# Patient Record
Sex: FEMALE | Race: WHITE | URBAN - METROPOLITAN AREA
[De-identification: names, ages, dates, MRNs, and addresses within clinical notes are randomized per-mention and may not be internally consistent; named-entity substitution may affect disease eponyms.]

---

## 2017-03-01 ENCOUNTER — HISTORICAL (OUTPATIENT)
Dept: ADMINISTRATIVE | Facility: HOSPITAL | Age: 32
End: 2017-03-01

## 2017-12-05 ENCOUNTER — HISTORICAL (OUTPATIENT)
Dept: INTERNAL MEDICINE | Facility: CLINIC | Age: 32
End: 2017-12-05

## 2017-12-05 LAB
ABS NEUT (OLG): 4 X10(3)/MCL (ref 2.1–9.2)
ALBUMIN SERPL-MCNC: 3.3 GM/DL (ref 3.4–5)
ALBUMIN/GLOB SERPL: 1 RATIO (ref 1–2)
ALP SERPL-CCNC: 37 UNIT/L (ref 45–117)
ALT SERPL-CCNC: 19 UNIT/L (ref 12–78)
APPEARANCE, UA: CLEAR
AST SERPL-CCNC: 9 UNIT/L (ref 15–37)
BACTERIA #/AREA URNS AUTO: ABNORMAL /[HPF]
BASOPHILS # BLD AUTO: 0.02 X10(3)/MCL
BASOPHILS NFR BLD AUTO: 0 % (ref 0–1)
BILIRUB SERPL-MCNC: 0.2 MG/DL (ref 0.2–1)
BILIRUB UR QL STRIP: NEGATIVE
BILIRUBIN DIRECT+TOT PNL SERPL-MCNC: <0.1 MG/DL
BILIRUBIN DIRECT+TOT PNL SERPL-MCNC: ABNORMAL MG/DL
BUN SERPL-MCNC: 18 MG/DL (ref 7–18)
CALCIUM SERPL-MCNC: 8.2 MG/DL (ref 8.5–10.1)
CHLORIDE SERPL-SCNC: 106 MMOL/L (ref 98–107)
CHOLEST SERPL-MCNC: 165 MG/DL
CHOLEST/HDLC SERPL: 3.1 {RATIO} (ref 0–4.4)
CO2 SERPL-SCNC: 32 MMOL/L (ref 21–32)
COLOR UR: ABNORMAL
CREAT SERPL-MCNC: 0.8 MG/DL (ref 0.6–1.3)
EOSINOPHIL # BLD AUTO: 0.11 X10(3)/MCL
EOSINOPHIL NFR BLD AUTO: 1 % (ref 0–5)
ERYTHROCYTE [DISTWIDTH] IN BLOOD BY AUTOMATED COUNT: 13.5 % (ref 11.5–14.5)
EST. AVERAGE GLUCOSE BLD GHB EST-MCNC: 105 MG/DL
GLOBULIN SER-MCNC: 2.9 GM/ML (ref 2.3–3.5)
GLUCOSE (UA): NORMAL
GLUCOSE SERPL-MCNC: 84 MG/DL (ref 74–106)
HAV IGM SERPL QL IA: NONREACTIVE
HBA1C MFR BLD: 5.3 % (ref 4.2–6.3)
HBV CORE IGM SERPL QL IA: NONREACTIVE
HBV SURFACE AG SERPL QL IA: NEGATIVE
HCT VFR BLD AUTO: 34.2 % (ref 35–46)
HCV AB SERPL QL IA: NONREACTIVE
HDLC SERPL-MCNC: 53 MG/DL
HGB BLD-MCNC: 11 GM/DL (ref 12–16)
HGB UR QL STRIP: 0.06 MG/DL
HIV 1+2 AB+HIV1 P24 AG SERPL QL IA: NONREACTIVE
HYALINE CASTS #/AREA URNS LPF: ABNORMAL /[LPF]
IMM GRANULOCYTES # BLD AUTO: 0.04 10*3/UL
IMM GRANULOCYTES NFR BLD AUTO: 0 %
KETONES UR QL STRIP: NEGATIVE
LDLC SERPL CALC-MCNC: 84 MG/DL (ref 0–130)
LEUKOCYTE ESTERASE UR QL STRIP: NEGATIVE
LYMPHOCYTES # BLD AUTO: 3.94 X10(3)/MCL
LYMPHOCYTES NFR BLD AUTO: 46 % (ref 15–40)
MCH RBC QN AUTO: 30.8 PG (ref 26–34)
MCHC RBC AUTO-ENTMCNC: 32.2 GM/DL (ref 31–37)
MCV RBC AUTO: 95.8 FL (ref 80–100)
MONOCYTES # BLD AUTO: 0.49 X10(3)/MCL
MONOCYTES NFR BLD AUTO: 6 % (ref 4–12)
NEUTROPHILS # BLD AUTO: 4 X10(3)/MCL
NEUTROPHILS NFR BLD AUTO: 46 X10(3)/MCL
NITRITE UR QL STRIP: NEGATIVE
PH UR STRIP: 6.5 [PH] (ref 4.5–8)
PLATELET # BLD AUTO: 205 X10(3)/MCL (ref 130–400)
PMV BLD AUTO: 11.3 FL (ref 7.4–10.4)
POTASSIUM SERPL-SCNC: 4.1 MMOL/L (ref 3.5–5.1)
PROT SERPL-MCNC: 6.2 GM/DL (ref 6.4–8.2)
PROT UR QL STRIP: NEGATIVE
RBC # BLD AUTO: 3.57 X10(6)/MCL (ref 4–5.2)
RBC #/AREA URNS AUTO: ABNORMAL /[HPF]
SODIUM SERPL-SCNC: 145 MMOL/L (ref 136–145)
SP GR UR STRIP: 1.01 (ref 1–1.03)
SQUAMOUS #/AREA URNS LPF: ABNORMAL /[LPF]
T4 FREE SERPL-MCNC: 0.7 NG/DL (ref 0.76–1.46)
TRIGL SERPL-MCNC: 138 MG/DL
TSH SERPL-ACNC: 6.76 MIU/L (ref 0.36–3.74)
UROBILINOGEN UR STRIP-ACNC: NORMAL MG/DL
VLDLC SERPL CALC-MCNC: 28 MG/DL
WBC # SPEC AUTO: 8.6 X10(3)/MCL (ref 4.5–11)
WBC #/AREA URNS AUTO: ABNORMAL /HPF

## 2017-12-06 ENCOUNTER — HISTORICAL (OUTPATIENT)
Dept: INTERNAL MEDICINE | Facility: CLINIC | Age: 32
End: 2017-12-06

## 2017-12-06 LAB
DEPRECATED CALCIDIOL+CALCIFEROL SERPL-MC: 22.07 NG/ML (ref 30–80)
MAGNESIUM SERPL-MCNC: 2.2 MG/DL (ref 1.8–2.4)
PTH-INTACT SERPL-MCNC: 94.8 PG/ML (ref 13.8–85)

## 2018-01-14 ENCOUNTER — HISTORICAL (OUTPATIENT)
Dept: INTERNAL MEDICINE | Facility: CLINIC | Age: 33
End: 2018-01-14

## 2018-01-14 LAB
ALBUMIN SERPL-MCNC: 3.7 GM/DL (ref 3.4–5)
ALBUMIN/GLOB SERPL: 1 RATIO (ref 1–2)
ALP SERPL-CCNC: 38 UNIT/L (ref 45–117)
ALT SERPL-CCNC: 17 UNIT/L (ref 12–78)
AST SERPL-CCNC: 13 UNIT/L (ref 15–37)
BILIRUB SERPL-MCNC: 0.3 MG/DL (ref 0.2–1)
BILIRUBIN DIRECT+TOT PNL SERPL-MCNC: 0.1 MG/DL
BILIRUBIN DIRECT+TOT PNL SERPL-MCNC: 0.2 MG/DL
BUN SERPL-MCNC: 27 MG/DL (ref 7–18)
CALCIUM SERPL-MCNC: 8.6 MG/DL (ref 8.5–10.1)
CHLORIDE SERPL-SCNC: 106 MMOL/L (ref 98–107)
CO2 SERPL-SCNC: 28 MMOL/L (ref 21–32)
CREAT SERPL-MCNC: 0.6 MG/DL (ref 0.6–1.3)
DEPRECATED CALCIDIOL+CALCIFEROL SERPL-MC: 33.04 NG/ML (ref 30–80)
GLOBULIN SER-MCNC: 3.2 GM/ML (ref 2.3–3.5)
GLUCOSE SERPL-MCNC: 96 MG/DL (ref 74–106)
POTASSIUM SERPL-SCNC: 4.1 MMOL/L (ref 3.5–5.1)
PROT SERPL-MCNC: 6.9 GM/DL (ref 6.4–8.2)
SODIUM SERPL-SCNC: 142 MMOL/L (ref 136–145)
T3FREE SERPL-MCNC: 2.46 PG/ML (ref 2.18–3.98)
T4 FREE SERPL-MCNC: 0.73 NG/DL (ref 0.76–1.46)
TSH SERPL-ACNC: 1.94 MIU/L (ref 0.36–3.74)

## 2020-05-17 ENCOUNTER — HOSPITAL ENCOUNTER (OUTPATIENT)
Dept: NUTRITION | Facility: HOSPITAL | Age: 35
End: 2020-05-18
Attending: SURGERY | Admitting: SURGERY

## 2020-05-17 LAB
ABS NEUT (OLG): 7.06 X10(3)/MCL (ref 2.1–9.2)
ALBUMIN SERPL-MCNC: 2.7 GM/DL (ref 3.5–5)
ALBUMIN/GLOB SERPL: 0.8 RATIO (ref 1.1–2)
ALP SERPL-CCNC: 747 UNIT/L (ref 40–150)
ALT SERPL-CCNC: 210 UNIT/L (ref 0–55)
ANISOCYTOSIS BLD QL SMEAR: 1
APPEARANCE, UA: CLEAR
AST SERPL-CCNC: 140 UNIT/L (ref 5–34)
B-HCG SERPL QL: NEGATIVE
BACTERIA SPEC CULT: ABNORMAL /HPF
BILIRUB SERPL-MCNC: 0.9 MG/DL
BILIRUB UR QL STRIP: ABNORMAL
BILIRUBIN DIRECT+TOT PNL SERPL-MCNC: 0.3 MG/DL (ref 0–0.8)
BILIRUBIN DIRECT+TOT PNL SERPL-MCNC: 0.6 MG/DL (ref 0–0.5)
BUN SERPL-MCNC: 18.7 MG/DL (ref 7–18.7)
CALCIUM SERPL-MCNC: 8.2 MG/DL (ref 8.4–10.2)
CHLORIDE SERPL-SCNC: 106 MMOL/L (ref 98–107)
CO2 SERPL-SCNC: 25 MMOL/L (ref 22–29)
COLOR UR: ABNORMAL
CREAT SERPL-MCNC: 0.7 MG/DL (ref 0.55–1.02)
ERYTHROCYTE [DISTWIDTH] IN BLOOD BY AUTOMATED COUNT: 13.9 % (ref 11.5–17)
FERRITIN SERPL-MCNC: 236.42 NG/ML (ref 4.63–204)
GLOBULIN SER-MCNC: 3.4 GM/DL (ref 2.4–3.5)
GLUCOSE (UA): NEGATIVE
GLUCOSE SERPL-MCNC: 125 MG/DL (ref 74–100)
GROUP & RH: NORMAL
HAV IGM SERPL QL IA: NONREACTIVE
HBV CORE IGM SERPL QL IA: NONREACTIVE
HBV SURFACE AG SERPL QL IA: NONREACTIVE
HCT VFR BLD AUTO: 32.9 % (ref 37–47)
HCV AB SERPL QL IA: NONREACTIVE
HEPATITIS PANEL INTERP: NORMAL
HGB BLD-MCNC: 10 GM/DL (ref 12–16)
HGB UR QL STRIP: NEGATIVE
HYPOCHROMIA BLD QL SMEAR: 1
INR PPP: 1.2 (ref 0–1.3)
IRON SATN MFR SERPL: 5 % (ref 20–50)
IRON SERPL-MCNC: 11 UG/DL (ref 50–170)
KETONES UR QL STRIP: NEGATIVE
LACTATE SERPL-SCNC: 0.9 MMOL/L (ref 0.5–2.2)
LEUKOCYTE ESTERASE UR QL STRIP: ABNORMAL
LIPASE SERPL-CCNC: 24 U/L
LYMPHOCYTES NFR BLD MANUAL: 8 % (ref 13–40)
MCH RBC QN AUTO: 27.2 PG (ref 27–31)
MCHC RBC AUTO-ENTMCNC: 30.4 GM/DL (ref 33–36)
MCV RBC AUTO: 89.6 FL (ref 80–94)
METAMYELOCYTES NFR BLD MANUAL: 1 %
MONOCYTES NFR BLD MANUAL: 1 % (ref 2–11)
NEUTROPHILS NFR BLD MANUAL: 105 % (ref 47–80)
NITRITE UR QL STRIP: POSITIVE
PH UR STRIP: 8.5 [PH] (ref 5–9)
PLATELET # BLD AUTO: 177 X10(3)/MCL (ref 130–400)
PLATELET # BLD EST: ADEQUATE 10*3/UL
PMV BLD AUTO: 10.7 FL (ref 7.4–10.4)
POIKILOCYTOSIS BLD QL SMEAR: 1
POTASSIUM SERPL-SCNC: 4.1 MMOL/L (ref 3.5–5.1)
PRODUCT READY: NORMAL
PROT SERPL-MCNC: 6.1 GM/DL (ref 6.4–8.3)
PROT UR QL STRIP: ABNORMAL
PROTHROMBIN TIME: 14.7 SECOND(S) (ref 11.1–13.7)
RBC # BLD AUTO: 3.67 X10(6)/MCL (ref 4.2–5.4)
RBC #/AREA URNS HPF: ABNORMAL /[HPF]
SODIUM SERPL-SCNC: 138 MMOL/L (ref 136–145)
SP GR UR STRIP: 1.02 (ref 1–1.03)
SQUAMOUS EPITHELIAL, UA: ABNORMAL
TARGETS BLD QL SMEAR: 1
TIBC SERPL-MCNC: 213 UG/DL (ref 70–310)
TIBC SERPL-MCNC: 224 UG/DL (ref 250–450)
TRANSFERRIN SERPL-MCNC: 193 MG/DL (ref 180–382)
UROBILINOGEN UR STRIP-ACNC: 2
WBC # SPEC AUTO: 8.3 X10(3)/MCL (ref 4.5–11.5)
WBC #/AREA URNS HPF: ABNORMAL /[HPF]

## 2020-05-18 LAB
ABS NEUT (OLG): 9.75 X10(3)/MCL (ref 2.1–9.2)
ALBUMIN SERPL-MCNC: 2.2 GM/DL (ref 3.5–5)
ALBUMIN/GLOB SERPL: 0.6 RATIO (ref 1.1–2)
ALP SERPL-CCNC: 624 UNIT/L (ref 40–150)
ALT SERPL-CCNC: 114 UNIT/L (ref 0–55)
AST SERPL-CCNC: 36 UNIT/L (ref 5–34)
BASOPHILS # BLD AUTO: 0.1 X10(3)/MCL (ref 0–0.2)
BASOPHILS NFR BLD AUTO: 1 %
BILIRUB SERPL-MCNC: 1.6 MG/DL
BILIRUBIN DIRECT+TOT PNL SERPL-MCNC: 0.6 MG/DL (ref 0–0.8)
BILIRUBIN DIRECT+TOT PNL SERPL-MCNC: 1 MG/DL (ref 0–0.5)
BUN SERPL-MCNC: 17.3 MG/DL (ref 7–18.7)
CALCIUM SERPL-MCNC: 8 MG/DL (ref 8.4–10.2)
CHLORIDE SERPL-SCNC: 107 MMOL/L (ref 98–107)
CO2 SERPL-SCNC: 22 MMOL/L (ref 22–29)
CREAT SERPL-MCNC: 0.61 MG/DL (ref 0.55–1.02)
EOSINOPHIL # BLD AUTO: 0 X10(3)/MCL (ref 0–0.9)
EOSINOPHIL NFR BLD AUTO: 0 %
ERYTHROCYTE [DISTWIDTH] IN BLOOD BY AUTOMATED COUNT: 14.3 % (ref 11.5–17)
GLOBULIN SER-MCNC: 3.6 GM/DL (ref 2.4–3.5)
GLUCOSE SERPL-MCNC: 99 MG/DL (ref 74–100)
HCT VFR BLD AUTO: 30.9 % (ref 37–47)
HGB BLD-MCNC: 9.6 GM/DL (ref 12–16)
LYMPHOCYTES # BLD AUTO: 1.5 X10(3)/MCL (ref 0.6–4.6)
LYMPHOCYTES NFR BLD AUTO: 12 %
MCH RBC QN AUTO: 27.5 PG (ref 27–31)
MCHC RBC AUTO-ENTMCNC: 31.1 GM/DL (ref 33–36)
MCV RBC AUTO: 88.5 FL (ref 80–94)
MONOCYTES # BLD AUTO: 0.9 X10(3)/MCL (ref 0.1–1.3)
MONOCYTES NFR BLD AUTO: 7 %
NEUTROPHILS # BLD AUTO: 9.75 X10(3)/MCL (ref 2.1–9.2)
NEUTROPHILS NFR BLD AUTO: 79 %
PLATELET # BLD AUTO: 173 X10(3)/MCL (ref 130–400)
PMV BLD AUTO: 10.7 FL (ref 9.4–12.4)
POTASSIUM SERPL-SCNC: 3.8 MMOL/L (ref 3.5–5.1)
PROT SERPL-MCNC: 5.8 GM/DL (ref 6.4–8.3)
RBC # BLD AUTO: 3.49 X10(6)/MCL (ref 4.2–5.4)
SODIUM SERPL-SCNC: 137 MMOL/L (ref 136–145)
WBC # SPEC AUTO: 12.4 X10(3)/MCL (ref 4.5–11.5)

## 2020-07-23 ENCOUNTER — HISTORICAL (OUTPATIENT)
Dept: ADMINISTRATIVE | Facility: HOSPITAL | Age: 35
End: 2020-07-23

## 2020-07-23 LAB
ABS NEUT (OLG): 4.01 X10(3)/MCL (ref 2.1–9.2)
ALBUMIN SERPL-MCNC: 4.1 GM/DL (ref 3.5–5)
ALBUMIN/GLOB SERPL: 1.2 RATIO (ref 1.1–2)
ALP SERPL-CCNC: 72 UNIT/L (ref 40–150)
ALT SERPL-CCNC: 11 UNIT/L (ref 0–55)
AST SERPL-CCNC: 15 UNIT/L (ref 5–34)
BASOPHILS # BLD AUTO: 0 X10(3)/MCL (ref 0–0.2)
BASOPHILS NFR BLD AUTO: 0 %
BILIRUB SERPL-MCNC: 0.3 MG/DL
BILIRUBIN DIRECT+TOT PNL SERPL-MCNC: 0.1 MG/DL (ref 0–0.8)
BILIRUBIN DIRECT+TOT PNL SERPL-MCNC: 0.2 MG/DL (ref 0–0.5)
BUN SERPL-MCNC: 14.9 MG/DL (ref 7–18.7)
CALCIUM SERPL-MCNC: 9.2 MG/DL (ref 8.4–10.2)
CHLORIDE SERPL-SCNC: 108 MMOL/L (ref 98–107)
CO2 SERPL-SCNC: 20 MMOL/L (ref 22–29)
CREAT SERPL-MCNC: 0.71 MG/DL (ref 0.55–1.02)
EOSINOPHIL # BLD AUTO: 0 X10(3)/MCL (ref 0–0.9)
EOSINOPHIL NFR BLD AUTO: 1 %
ERYTHROCYTE [DISTWIDTH] IN BLOOD BY AUTOMATED COUNT: 14.5 % (ref 11.5–17)
GLOBULIN SER-MCNC: 3.3 GM/DL (ref 2.4–3.5)
GLUCOSE SERPL-MCNC: 80 MG/DL (ref 74–100)
HCT VFR BLD AUTO: 38.2 % (ref 37–47)
HGB BLD-MCNC: 12.3 GM/DL (ref 12–16)
IMM GRANULOCYTES # BLD AUTO: 0 10*3/UL
IMM GRANULOCYTES NFR BLD AUTO: 0 %
LYMPHOCYTES # BLD AUTO: 2.4 X10(3)/MCL (ref 0.6–4.6)
LYMPHOCYTES NFR BLD AUTO: 35 %
MCH RBC QN AUTO: 29.9 PG (ref 27–31)
MCHC RBC AUTO-ENTMCNC: 32.2 GM/DL (ref 33–36)
MCV RBC AUTO: 92.7 FL (ref 80–94)
MONOCYTES # BLD AUTO: 0.4 X10(3)/MCL (ref 0.1–1.3)
MONOCYTES NFR BLD AUTO: 5 %
NEUTROPHILS # BLD AUTO: 4.01 X10(3)/MCL (ref 2.1–9.2)
NEUTROPHILS NFR BLD AUTO: 58 %
PLATELET # BLD AUTO: 194 X10(3)/MCL (ref 130–400)
PMV BLD AUTO: 11.9 FL (ref 9.4–12.4)
POTASSIUM SERPL-SCNC: 4 MMOL/L (ref 3.5–5.1)
PROT SERPL-MCNC: 7.4 GM/DL (ref 6.4–8.3)
RBC # BLD AUTO: 4.12 X10(6)/MCL (ref 4.2–5.4)
SODIUM SERPL-SCNC: 139 MMOL/L (ref 136–145)
WBC # SPEC AUTO: 6.9 X10(3)/MCL (ref 4.5–11.5)

## 2022-04-11 ENCOUNTER — HISTORICAL (OUTPATIENT)
Dept: ADMINISTRATIVE | Facility: HOSPITAL | Age: 37
End: 2022-04-11

## 2022-04-29 VITALS
WEIGHT: 147.5 LBS | BODY MASS INDEX: 26.13 KG/M2 | DIASTOLIC BLOOD PRESSURE: 79 MMHG | SYSTOLIC BLOOD PRESSURE: 129 MMHG | HEIGHT: 63 IN

## 2022-04-30 NOTE — ED PROVIDER NOTES
Patient:   Leticia Gardner            MRN: [id number]            FIN: [id number]               Age:   34 years     Sex:  Female     :  1985   Associated Diagnoses:   RUQ abdominal mass; SOB - Shortness of breath; Pain in abdominal muscle of left flank; UTI (urinary tract infection)   Author:   Eamon Cortez MD      Report Summary       General:       Alert, no acute distress.       Basic Information   Time seen: Immediately upon arrival.   History source: Patient.   Arrival mode: Private vehicle.   History limitation: None.   Additional information: Chief Complaint from Nursing Triage Note : Chief Complaint   2020 0:49 CDT       Chief Complaint           PT. ARRIVES VIA ems c/O SOB /ABD PAIN AND FEVER.. REPORTS DX WITH UTI X1 WEEK..  .   Provider/Visit info:   Time Seen:  Eamon Cortez MD / 2020 00:55  .   History of Present Illness   The patient presents with abdominal pain.  The onset was 1  weeks ago.  The course/duration of symptoms is constant and worsening.  The character of symptoms is achy.  The degree at onset was minimal.  The Location of pain at onset was diffuse.  The degree at present is moderate.  The Location of pain at present is diffuse.  Radiating pain: none. The exacerbating factor is none.  The relieving factor is none.  Therapy today: antibiotics  .  Risk factors consist of none.  Associated symptoms: nausea and denies vomiting.  I, Dr Cortez, assumed the care of the patient at 2020. Pt is a  34 Years old Female with a h/o recurrent UTIs anxiety whom presents c/o 1 weeks worth of persistent abdominal pain which went on at the onset she was seen by her physician and diagnosed with UTI on antibiotics which she was still taken.  Does note some nausea but no vomiting and states that the pain has become more diffuse and now she is been having fevers with a lasted to Tylenol just a few hours PTA..        Review of Systems   Constitutional symptoms:  Negative except  as documented in HPI.   Skin symptoms:  Negative except as documented in HPI.   Eye symptoms:  Negative except as documented in HPI.   ENMT symptoms:  Negative except as documented in HPI.   Respiratory symptoms:  Negative except as documented in HPI.   Cardiovascular symptoms:  Negative except as documented in HPI.   Gastrointestinal symptoms:  Negative except as documented in HPI.   Genitourinary symptoms:  Negative except as documented in HPI.   Musculoskeletal symptoms:  Negative except as documented in HPI.   Neurologic symptoms:  Negative except as documented in HPI.   Psychiatric symptoms:  Negative except as documented in HPI.   Endocrine symptoms:  Negative except as documented in HPI.   Hematologic/Lymphatic symptoms:  Negative except as documented in HPI.   Allergy/immunologic symptoms:  Negative except as documented in HPI.      Health Status   Allergies:    Allergic Reactions (Selected)  Severity Not Documented  NKDA - No known drug allergies- No reactions were documented.,    Allergies (1) Active Reaction  NKDA - No known drug allergies None Documented  .   Medications:  (Selected)   Inpatient Medications  Ordered  Protonix: 40 mg, form: Injection, IV Slow, Daily, first dose 05/17/20 5:19:00 CDT, STAT  Sodium Chloride 0.9% intravenous solution 1,000 mL: 1,000 mL, 1,000 mL, IV, 125 mL/hr, start date 05/17/20 5:19:00 CDT  Sodium Chloride 0.9% intravenous solution 1,000 mL: 1,000 mL, 1,000 mL, IV, Bolus, start date 05/17/20 1:05:00 CDT  Zofran: 4 mg, form: Injection, IV Push, q4hr PRN for nausea, first dose 05/17/20 5:19:00 CDT, STAT, choose first if ordered with other treatments for nausea  acetaminophen: 650 mg, form: Liquid, Oral, q6hr PRN for fever, first dose 05/17/20 5:19:00 CDT, STAT, > 38.1 degrees Celsius  mineral oil: 1 EA, form: Enema, WA (rectal), Once, first dose 10/29/19 5:00:00 CDT, stop date 10/29/19 5:00:00 CDT  morphine 1 mg/mL preservative-free injectable solution: 2 mg, form: PF Inj,  IV, q3hr PRN for breakthru pain, first dose 20 5:19:00 CDT, STAT  oxycodone 5 mg oral tablet: 10 mg, form: Tab, Oral, q4hr PRN for pain, severe, first dose 20 5:19:00 CDT, STAT  oxycodone 5 mg oral tablet: 5 mg, form: Tab, Oral, Once PRN for pain, moderate, first dose 20 5:19:00 CDT, STAT  Prescriptions  Prescribed  Bentyl 10 mg oral capsule: 10 mg = 1 cap(s), Oral, QID, PRN PRN abdominal cramping, # 40 cap(s), 0 Refill(s)  Zofran ODT 4 mg oral tablet, disintegratin mg = 1 tab(s), Oral, q6hr, PRN PRN nausea/vomiting, # 10 tab(s), 0 Refill(s)  levothyroxine 25 mcg (0.025 mg) oral tablet: 0.5 tab, Oral, Daily, # 30 tab(s), 6 Refill(s), Pharmacy: [id number]  Documented Medications  Documented  diclofenac sodium 75 mg oral delayed release tablet: 75 mg = 1 tab(s), Oral, BID  nitrofurantoin macrocrystals-monohydrate 100 mg oral capsule: 100 mg = 1 cap(s), Oral, BID  phenazopyridine 200 mg oral tablet: 200 mg = 1 tab(s), Oral, TID.      Past Medical/ Family/ Social History   Medical history:    Active  anxiety ([id number])  depression ([id number])  Resolved  can lie flat ([id number]):  Resolved.  can walk 2 blocks briskly without shortness of breath or chest pain ([id number]):  Resolved.  kidney infection ([id number]):  Resolved.  Comments:  2014 CDT 13:01 Malinda Jacobs RN  hospitalized with severe kidney infection .   Surgical history:    Shoulder Anterior Reconstruction (Right) on 2014 at 28 Years.  Comments:  2014 12:00 Nicki Beach RN  auto-populated from documented surgical case  hernia repair.  Comments:  2014 13:07 Malinda Jacobs RN  1995  Dilation and curettage ([id number]).  Comments:  2014 13:07 CDT - Oliver PEÑA, Malinda BRICEÑO  2010.   Family history:    Entire family history is negative..   Social history:    Social & Psychosocial Habits    Alcohol  2018  Use: Past    Type: Beer    Frequency: 3-5 times per  week    Comment: drinks a 6 pack on the weekends - 04/20/2014 13:09 - Oliver PEÑA, Malinda BRICEÑO    Employment/School  09/15/2016  Status: Unemployed    Highest education: Some college    Home/Environment  09/15/2016  Lives with: Significant other    Living situation: Home/Independent    Alcohol abuse in household: No    Substance abuse in household: No    Smoker in household: No    Injuries/Abuse/Neglect in household: No    Feels unsafe at home: No    Safe place to go: Yes    Family/Friends available to help: Yes    Concern for family members at home: No    Major illness in household: No    Financial concerns: Yes    Concerns over TV/Computer/Game use: No    Nutrition/Health  03/01/2017  Type of diet: Regular    Wants to lose weight: No    Substance Use  01/08/2016 Risk Assessment: Denies Substance Abuse    09/15/2016  Use: Past    Type: Cocaine, Marijuana, Prescription medications    Started at age: 14 Years    IV drug use: No    01/09/2019  Use: Current    Type: Prescription medications    Frequency: Daily    Previous treatment: None    IV drug use: No    Has drug use interfered with your work or home life? Yes    Ready to change: Yes    Concerns about substance abuse in household: Yes    Tobacco  04/20/2014  Use: Never smoker    01/08/2016 Risk Assessment: Denies Tobacco Use    01/09/2019  Use: Never (less than 100 in l    Patient Wants Consult For Cessation Counseling N/A    Concerns about tobacco use in household: No    Smokeless tobacco use: Never    07/09/2019  Use: Never (less than 100 in l    Patient Wants Consult For Cessation Counseling No    07/09/2019  Use: Never (less than 100 in l    Patient Wants Consult For Cessation Counseling No    09/14/2019  Use: Former smoker, quit more    Patient Wants Consult For Cessation Counseling N/A    Smokeless tobacco use: Smokeless tobacco user wi    10/28/2019  Use: Never (less than 100 in l    Patient Wants Consult For Cessation Counseling  N/A    Abuse/Neglect  07/09/2019  SHX Any signs of abuse or neglect No    09/14/2019  SHX Any signs of abuse or neglect No    10/28/2019  SHX Any signs of abuse or neglect No  .   Problem list:    Active Problems (11)  anxiety   Chronic actinic dermatitis   depression   Detoxification   Fall risk   Hypothyroidism   Ineffective coping (individual)   Insomnia   Knowledge deficit   Mild anxiety   Mild depression   .      Physical Examination               Vital Signs   Vital Signs   5/17/2020 3:00 CDT       Temperature Oral          38.3 DegC  HI                             Temperature Oral (calculated)             100.94 DegF                             Peripheral Pulse Rate     121 bpm  HI                             Heart Rate Monitored      122 bpm  HI                             Respiratory Rate          17 br/min                             SpO2                      99 %                             Oxygen Therapy            Room air                             Systolic Blood Pressure   109 mmHg                             Diastolic Blood Pressure  71 mmHg                             Mean Arterial Pressure, Cuff              84 mmHg    5/17/2020 1:30 CDT       Peripheral Pulse Rate     110 bpm  HI                             Heart Rate Monitored      110 bpm  HI                             Respiratory Rate          24 br/min                             SpO2                      95 %                             Oxygen Therapy            Room air                             Systolic Blood Pressure   118 mmHg                             Diastolic Blood Pressure  81 mmHg                             Mean Arterial Pressure, Cuff              93 mmHg    5/17/2020 1:00 CDT       Peripheral Pulse Rate     117 bpm  HI                             Heart Rate Monitored      117 bpm  HI                             Respiratory Rate          15 br/min                             SpO2                      95 %                              Oxygen Therapy            Room air                             Systolic Blood Pressure   122 mmHg                             Diastolic Blood Pressure  75 mmHg                             Mean Arterial Pressure, Cuff              91 mmHg    5/17/2020 0:49 CDT       Temperature Oral          38.1 DegC  HI                             Temperature Oral (calculated)             100.58 DegF                             Peripheral Pulse Rate     120 bpm  HI                             Respiratory Rate          24 br/min                             SpO2                      99 %                             Oxygen Therapy            Room air                             Systolic Blood Pressure   120 mmHg                             Diastolic Blood Pressure  74 mmHg  .      Vital Signs (last 24 hrs)_____  Last Charted___________  Temp Oral     H 38.3DegC  (MAY 17 03:00)  Heart Rate Peripheral   H 121bpm  (MAY 17 03:00)  Resp Rate         17 br/min  (MAY 17 03:00)  SBP      109 mmHg  (MAY 17 03:00)  DBP      71 mmHg  (MAY 17 03:00)  SpO2      99 %  (MAY 17 03:00)  .   Basic Oxygen Information   5/17/2020 3:00 CDT       SpO2                      99 %                             Oxygen Therapy            Room air    5/17/2020 1:30 CDT       SpO2                      95 %                             Oxygen Therapy            Room air    5/17/2020 1:00 CDT       SpO2                      95 %                             Oxygen Therapy            Room air    5/17/2020 0:49 CDT       SpO2                      99 %                             Oxygen Therapy            Room air  .   General:  Alert, no acute distress.    Skin:  Warm, dry, no rash.    Head:  Normocephalic, atraumatic.    Neck:  Supple, trachea midline, no JVD.    Eye:  Pupils are equal, round and reactive to light, extraocular movements are intact, normal conjunctiva, vision unchanged.    Ears, nose, mouth and throat:  Tympanic membranes clear, oral mucosa moist,  no pharyngeal erythema or exudate.    Cardiovascular:  Regular rate and rhythm, No murmur, No edema.    Respiratory:  Lungs are clear to auscultation, respirations are non-labored, breath sounds are equal, Symmetrical chest wall expansion.    Chest wall:  No tenderness.   Back:  Normal range of motion.   Musculoskeletal:  Normal ROM, normal strength, no tenderness.    Gastrointestinal:  Soft, Non distended, Normal bowel sounds, No organomegaly, Tenderness: Severe, generalized, Worse over the right upper quadrant and epigastric region, Guarding: Negative, Rebound: Negative.    Neurological:  Alert and oriented to person, place, time, and situation, No focal neurological deficit observed, CN II-XII intact, normal sensory observed, normal motor observed, normal speech observed.    Psychiatric:  Cooperative, appropriate mood & affect, normal judgment.             Medical Decision Making   Differential Diagnosis:  Abdominal pain, Appendicitis, bowel obstruction, bowel perforation, renal stone, ureteral stone, biliary colic, cholecystitis, hepatitis, pancreatitis, irritable bowel syndrome, urinary tract infection, pyelonephritis, gastroenteritis, peptic ulcer disease, gastritis, ectopic pregnancy, incarcerated hernia.    Rationale:  Young female here for persistent abdominal pain for the last week diagnosed with UTI.  Vital signs here with tachycardia to the 120s, febrile at 38.3, normal sats on room air and normotensive.  Patient given Tylenol with improvement in her fever.  Exam with significant diffuse abdominal pain.  Labs notable for transaminitis with normal T bili.  UPT negative lipase unremarkable.  UA with persistent evidence of UTI given a dose of Rocephin.  Given IV fluids and multiple doses of IV narcotics with some improvement of pain but always returns.  CT of the abdomen pelvis with constipation, small amount of free fluid in the pericholecystic space in the pelvis and gallbladder with diffuse mucosal wall  enhancement.  With this information an ultrasound of the right upper quadrant was obtained and showed positive contracted gallbladder with apparent minimal wall thickening without evidence of cholelithiasis.  With patient's persistent abdominal pain and associated elevation in liver enzymes will admit to the surgical hospitalist for further abdominal exams and rule out acalculous cholecystitis..   Electrocardiogram:  Time 5/17/2020 00:56:00, rate 111, no ectopy, normal IL & QRS intervals, EP Interp, sinus tachycardia.    Results review:     Labs (Last four charted values)  WBC                  8.3 (MAY 17)   Hgb                  L 10.0 (MAY 17)   Hct                  L 32.9 (MAY 17)   Plt                  177 (MAY 17)   Na                   138 (MAY 17)   K                    4.1 (MAY 17)   CO2                  25 (MAY 17)   Cl                   106 (MAY 17)   Cr                   0.70 (MAY 17)   BUN                  18.7 (MAY 17)   Glucose Random       H 125 (MAY 17) .      Reexamination/ Reevaluation   Vital signs   Basic Oxygen Information   5/17/2020 3:00 CDT       SpO2                      99 %                             Oxygen Therapy            Room air    5/17/2020 1:30 CDT       SpO2                      95 %                             Oxygen Therapy            Room air    5/17/2020 1:00 CDT       SpO2                      95 %                             Oxygen Therapy            Room air    5/17/2020 0:49 CDT       SpO2                      99 %                             Oxygen Therapy            Room air        Impression and Plan   Diagnosis   RUQ abdominal mass (FKE74-AY R19.01)   SOB - Shortness of breath (PNED [id number])   Pain in abdominal muscle of left flank (LNI55-EO R10.9)   UTI (urinary tract infection) (FVY62-VL N39.0)      Calls-Consults   -  5/17/2020 04:25:00 , surgical resident.    Plan   Condition: Improved, Stable.    Disposition: Admit to Inpatient Unit.     Counseled: Patient, Regarding diagnosis, Regarding diagnostic results, Regarding treatment plan, Patient indicated understanding of instructions.

## 2022-04-30 NOTE — OP NOTE
DATE OF SURGERY:    05/18/2020    SURGEON:  Varun Lewis MD    PREOPERATIVE DIAGNOSIS:  Acute cholecystitis.    POSTOPERATIVE DIAGNOSIS:  Acute cholecystitis.    PROCEDURE PERFORMED:  Laparoscopic cholecystectomy.    COMPLICATIONS:  None.    ESTIMATED BLOOD LOSS:  Minimal.    SPECIMENS:  Gallbladder.    ANESTHESIA:  General.    FINDINGS:  Acutely inflamed gallbladder.  Critical view was obtained prior to ligation and transection of structures.    INDICATIONS FOR PROCEDURE:  Ms. Gardner is a 34-year-old female who presented with a several-day history of worsening abdominal pain associated with some fevers.  She was originally treated for urinary tract infection and symptoms did not simon.  Began having right upper quadrant pain with focalized, presented to the hospital and workup including a HIDA scan was consistent with cholecystitis.    PROCEDURE IN DETAIL:  Patient was taken to the operating room, laid supine on the operating table.  Time-out was performed and all were in agreement.  After appropriate anesthesia was induced, she was prepped and draped in normal sterile fashion exposing the abdomen.  Infraumbilical curvilinear incision was made, carried down to the fascia.  Incision was then made into the peritoneal cavity.  A 0 Vicryl stay stitch was used to secure Anay trocar.  Three additional 5 mm trocars were placed across the right upper quadrant.  Local anesthetic was used prior to placement.  The gallbladder was identified, grasped, retracted upwards and over the liver.  After a moderate amount of dissection the critical view was able to be obtained.  The cystic artery was lying along the cystic duct, and was able to be dissected away and due to its small nature was cauterized.  There were several inflamed lymph nodes in the area.  The node of Calot was swept down with the surrounding tissue.  However, there was additional lymph nodes on the gallbladder that were resected with the specimen.   Two clips were placed on the safe side of the cystic duct.  One clip was placed on the transection side and was then transected between the clips.  The gallbladder was then removed off the liver in a retrograde fashion using hook electrocautery and placed into an EndoCatch bag.  The right upper quadrant was copiously irrigated and suctioned of all particulate matter.  Hemostasis was obtained with cautery.       Pneumoperitoneum was then evacuated.  Trocars were removed and the gallbladder was removed via EndoCatch bag from the umbilical trocar site.  0 Vicryl stay stitch was used to close the fascial incision and skin incisions were closed with subcuticular 4-0 Monocryl.  The patient tolerated the procedure well.  There were no immediate postoperative complications.  All needle and instrument counts were correct.  I was present and scrubbed for the entire duration of procedure.        ______________________________  Varun Lewis MD    OVI/  DD:  05/18/2020  Time:  10:36AM  DT:  05/18/2020  Time:  11:54AM  Job #:  [id number]

## 2022-05-05 NOTE — HISTORICAL [facility] CERNER
This is a historical note converted from [employer]. Formatting and pictures may have been removed.  Please reference Agustin for original formatting and attached multimedia. Admit and Discharge Dates  Admit Date: 05/17/2020  Discharge Date: 05/18/2020  Physicians  Attending Physician - Kalpesh ESPINOZA, Fernando Alejandre  Admitting Physician - Kalpesh ESPINOZA, Fernando Alejandre  Primary Care Physician - No PCP, No  Discharge Diagnosis  acute cholecystitis  UTI  Surgical Procedures  05/18/2020 - [id number] - Cholecystectomy Laparoscopic  Immunizations  No immunizations recorded for this visit.  Admission Information  33 yo F w/ PMH recent UTI presented to ED on 5/17 w/ diffuse abd pain. Ultrasound showed GB WNL however labs and HIDA were concerning for acute cholecystitis. Taken to OR on 5/18 for laparoscopic cholecystectomy, no complications. Discharged once tolerating PO and pain controlled. Instructed to complete her previously prescribed abx for UTI.  Significant Findings  HIDA: 1. No convincing gallbladder activity after 4 hours of imaging. This  is suggestive of cystic duct obstruction.  2. Patent common bile duct.  ?  Time Spent on discharge  > 30 min  Objective  Vitals & Measurements  T:?36.4? ?C (Oral)? TMIN:?36.4? ?C (Oral)? TMAX:?37.2? ?C (Oral)? HR:?89(Peripheral)? RR:?21? BP:?94/66? SpO2:?96%?  Physical Exam  A&Ox3  RRR  stable on RA  abd soft, mildly distended, appropiratly ttp, incisoin dressings c/d/i  Patient Discharge Condition  stable q  Discharge Disposition  -home  -complete abx for UTI  ?   Discharge Medication Reconciliation  Prescribed  acetaminophen-oxyCODONE (Percocet 5 mg-325 mg oral tablet)?1 tab(s), Oral, q6hr  Continue  diclofenac (diclofenac sodium 75 mg oral delayed release tablet)?75 mg, Oral, BID  dicyclomine (Bentyl 10 mg oral capsule)?10 mg, Oral, QID, PRN abdominal cramping  levothyroxine (levothyroxine 25 mcg (0.025 mg) oral tablet)?0.5 tab, Oral, Daily  nitrofurantoin (nitrofurantoin  macrocrystals-monohydrate 100 mg oral capsule)?100 mg, Oral, BID  ondansetron (Zofran ODT 4 mg oral tablet, disintegrating)?4 mg, Oral, q6hr, PRN nausea/vomiting  ondansetron (Zofran ODT 4 mg oral tablet, disintegrating)?4 mg, Oral, q6hr, PRN nausea/vomiting  phenazopyridine (phenazopyridine 200 mg oral tablet)?200 mg, Oral, TID  Education and Orders Provided  Laparoscopic Cholecystectomy, Care After, Easy-to-Read  Cholecystitis, Easy-to-Read  Incision Care, Adult (Custom)  Lap libby Low-Fat Diet for Pancreatitis or Gallbladder Conditions (Custom) (Custom)  Discharge - 05/18/20 18:42:00 CDT, Home, Give all scheduled vaccinations prior to discharge.?  Discharge Activity - Activity as Tolerated?  Discharge Diet - Regular?  Discharge Wound Care - 05/18/20 18:42:00 CDT, At Discharge, Stop date 05/18/20 18:42:00 CDT, Dissolvable sutures. Can shower as normal. No tub baths/pool x 1 week.?  Follow up  Surgical Hospitalist Clinic Appointment  ????  Office will call and check on you. Please call with any questions or problems

## 2022-05-05 NOTE — HISTORICAL [facility] CERNER
This is a historical note converted from [employer]. Formatting and pictures may have been removed.  Please reference [employer] for original formatting and attached multimedia. Chief Complaint  PT. ARRIVES VIA ems c/O SOB /ABD PAIN AND FEVER.. REPORTS DX WITH UTI X1 WEEK..  History of Present Illness  34-year-old woman with history of hypothyroidism,?depression, anxiety who presents with 1 week history of abdominal pain which has?steadily progressed.? She localizes it to her epigastric and right upper quadrant region.? CT scan revealed?pericholecystic fluid with no stones in the gallbladder. ?Ultrasound showed a constricted gallbladder?with 3.3 mm thickness and no stones.? She is hemodynamically stable?but still with exquisite right upper quadrant abdominal pain even after receiving 8 mg of morphine, ibuprofen, and Tylenol.? She was recently treated for UTI for which she is on an antibiotic for 1 week.  ?  Labs are significant for bilirubin 0.9, , , alk phos 747. ?No leukocytosis.  Review of Systems  Positive for nausea, vomiting,?abdominal pain.? Occasional stools. ?No chest pain, no shortness of breath  Physical Exam  Vitals & Measurements  T:?37.1? ?C (Oral)? TMIN:?37.1? ?C (Oral)? TMAX:?38.3? ?C (Oral)? HR:?122(Peripheral)? HR:?122(Monitored)? RR:?18? BP:?113/76? SpO2:?100%?  General:?In some distress  Cardiovascular:?Tachycardic  Respiratory: Normal work of breathing, no shortness of breath  Abdomen:?Soft, nondistended,?tender to palpation in the epigastric and right upper quadrant.  Extremities:?No issues  ?  CT: Pericholecystic fluid, no stones, constipation  Ultrasound:?Constricted?gallbladder, 3.3 mm wall thickness, no stones  Assessment/Plan  34-year-old woman?with?diffuse abdominal?pain?concerning for possible cholecystitis versus pyelonephritis  ?  -Admit to surgery  -Serial abdominal exams  -Rocephin?for UTI  -HIDA scan  -Daily labs  -Follow-up?hepatitis panel   Problem List/Past Medical  History  Ongoing  anxiety  Chronic actinic dermatitis  depression  Detoxification  Fall risk  Hypothyroidism  Insomnia  Mild anxiety  Mild depression  Historical  can lie flat  can walk 2 blocks briskly without shortness of breath or chest pain  kidney infection  Procedure/Surgical History  Detoxification Services for Substance Abuse Treatment (01/09/2019)  Drainage of Buttock Skin, External Approach (01/14/2017)  Incision and drainage of abscess (eg, carbuncle, suppurative hidradenitis, cutaneous or subcutaneous abscess, cyst, furuncle, or paronychia); complicated or multiple (01/14/2017)  Capsulorrhaphy, anterior; Putti-Soledad procedure or Meghna type operation. (04/23/2014)  Injection of anesthetic into peripheral nerve for analgesia (04/23/2014)  Injection, anesthetic agent; brachial plexus, single. (04/23/2014)  Repair of recurrent dislocation of shoulder (04/23/2014)  Shoulder Anterior Reconstruction (Right) (04/23/2014)  Contrast arthrogram (02/26/2014)  Fluoroscopic guidance for needle placement (eg, biopsy, aspiration, injection, localization device). (02/26/2014)  Injection procedure for shoulder arthrography or enhanced CT/MRI shoulder arthrography. (02/26/2014)  Magnetic resonance (eg, proton) imaging, any joint of upper extremity; with contrast material(s). (02/26/2014)  Dilation and curettage  hernia repair   Medications  Inpatient  acetaminophen, 650 mg= 20.3 mL, Oral, q6hr, PRN  mineral oil, 1 EA, [place] (rectal), Once  morphine 4 mg/mL preservative-free injectable solution, 2 mg= 0.5 mL, IV, q3hr, PRN  oxycodone 5 mg oral tablet, 10 mg= 2 tab(s), Oral, q4hr, PRN  oxycodone 5 mg oral tablet, 5 mg= 1 tab(s), Oral, Once, PRN  Protonix, 40 mg= 1 EA, IV Slow, Daily  Sodium Chloride 0.9% intravenous solution 1,000 mL, 1000 mL, IV  Sodium Chloride 0.9% intravenous solution 1,000 mL, 1000 mL, IV  Zofran, 4 mg= 2 mL, IV Push, q4hr, PRN  Home  Bentyl 10 mg oral capsule, 10 mg= 1 cap(s), Oral, QID,  PRN  diclofenac sodium 75 mg oral delayed release tablet, 75 mg= 1 tab(s), Oral, BID  levothyroxine 25 mcg (0.025 mg) oral tablet, 0.5 tab, Oral, Daily, 6 refills  nitrofurantoin macrocrystals-monohydrate 100 mg oral capsule, 100 mg= 1 cap(s), Oral, BID  phenazopyridine 200 mg oral tablet, 200 mg= 1 tab(s), Oral, TID  Zofran ODT 4 mg oral tablet, disintegrating, 4 mg= 1 tab(s), Oral, q6hr, PRN  Allergies  NKDA - No known drug allergies  Social History  Abuse/Neglect  No, 10/28/2019  No, 09/14/2019  No, 07/09/2019  Alcohol  Past, Beer, 3-5 times per week, 08/20/2018  Employment/School  Unemployed, Highest education level: Some college., 09/15/2016  Home/Environment  Lives with Significant other. Living situation: Home/Independent. Alcohol abuse in household: No. Substance abuse in household: No. Smoker in household: No. Injuries/Abuse/Neglect in household: No. Feels unsafe at home: No. Safe place to go: Yes. Family/Friends available for support: Yes. Concern for family members at home: No. Major illness in household: No. Financial concerns: Yes. TV/Computer concerns: No., 09/15/2016  Nutrition/Health  Regular, Wants to lose weight: No., 03/01/2017  Substance Use - Denies Substance Abuse, 01/08/2016  Current, Prescription medications, Daily, Previous treatment: None. IV drug use: No. Drug use interferes with work/home: Yes. Ready to change: Yes. Household substance abuse concerns: Yes., 01/09/2019  Past, Cocaine, Marijuana, Prescription medications, Started age 14 Years. IV drug use: No., 09/15/2016  Tobacco - Denies Tobacco Use, 01/08/2016  Never (less than 100 in lifetime), N/A, 10/28/2019  Former smoker, quit more than 30 days ago, N/A, Smokeless Tobacco Use: Smokeless tobacco user within last 30 days., 09/14/2019  Never (less than 100 in lifetime), No, 07/09/2019  Never (less than 100 in lifetime), No, 07/09/2019  Never (less than 100 in lifetime), N/A, Household tobacco concerns: No. Smokeless Tobacco Use:  Never., 01/09/2019  Never smoker, 04/20/2014  Family History  Family history is negative  Immunizations  Vaccine Date Status Comments   tetanus/diphtheria/pertussis, acel(Tdap) 01/04/2016 Given other (see comment)   measles/mumps/rubella virus vaccine 09/30/2003 Recorded    tetanus-diphtheria toxoids 09/30/2003 Recorded    hepatitis B pediatric vaccine 09/09/2003 Recorded    poliovirus vaccine, inactivated 07/14/1988 Recorded    poliovirus vaccine, inactivated 10/11/1984 Recorded    measles/mumps/rubella virus vaccine 10/11/1984 Recorded    poliovirus vaccine, inactivated 1983 Recorded    poliovirus vaccine, inactivated 1983 Recorded    poliovirus vaccine, inactivated 1983 Recorded        agree with above assessment and plan